# Patient Record
Sex: FEMALE | Race: OTHER | Employment: FULL TIME | URBAN - METROPOLITAN AREA
[De-identification: names, ages, dates, MRNs, and addresses within clinical notes are randomized per-mention and may not be internally consistent; named-entity substitution may affect disease eponyms.]

---

## 2019-12-21 ENCOUNTER — APPOINTMENT (OUTPATIENT)
Dept: GENERAL RADIOLOGY | Age: 54
End: 2019-12-21
Attending: EMERGENCY MEDICINE
Payer: SELF-PAY

## 2019-12-21 ENCOUNTER — HOSPITAL ENCOUNTER (EMERGENCY)
Age: 54
Discharge: HOME | End: 2019-12-21
Attending: EMERGENCY MEDICINE
Payer: SELF-PAY

## 2019-12-21 VITALS
TEMPERATURE: 98 F | OXYGEN SATURATION: 96 % | DIASTOLIC BLOOD PRESSURE: 57 MMHG | HEART RATE: 71 BPM | RESPIRATION RATE: 21 BRPM | SYSTOLIC BLOOD PRESSURE: 110 MMHG

## 2019-12-21 DIAGNOSIS — R07.9 CHEST PAIN, UNSPECIFIED TYPE: Primary | ICD-10-CM

## 2019-12-21 DIAGNOSIS — E87.6 HYPOKALEMIA: ICD-10-CM

## 2019-12-21 LAB
ALBUMIN SERPL-MCNC: 4.1 G/DL (ref 3.5–5)
ALBUMIN/GLOB SERPL: 1 {RATIO} (ref 1.2–3.5)
ALP SERPL-CCNC: 91 U/L (ref 50–130)
ALT SERPL-CCNC: 22 U/L (ref 12–65)
ANION GAP SERPL CALC-SCNC: 7 MMOL/L (ref 7–16)
AST SERPL-CCNC: 17 U/L (ref 15–37)
BASOPHILS # BLD: 0 K/UL (ref 0–0.2)
BASOPHILS NFR BLD: 0 % (ref 0–2)
BILIRUB SERPL-MCNC: 0.3 MG/DL (ref 0.2–1.1)
BUN SERPL-MCNC: 16 MG/DL (ref 6–23)
CALCIUM SERPL-MCNC: 9.4 MG/DL (ref 8.3–10.4)
CHLORIDE SERPL-SCNC: 100 MMOL/L (ref 98–107)
CO2 SERPL-SCNC: 31 MMOL/L (ref 21–32)
CREAT SERPL-MCNC: 0.82 MG/DL (ref 0.6–1)
D DIMER PPP FEU-MCNC: 0.29 UG/ML(FEU)
DIFFERENTIAL METHOD BLD: ABNORMAL
EOSINOPHIL # BLD: 0.2 K/UL (ref 0–0.8)
EOSINOPHIL NFR BLD: 1 % (ref 0.5–7.8)
ERYTHROCYTE [DISTWIDTH] IN BLOOD BY AUTOMATED COUNT: 13.2 % (ref 11.9–14.6)
GLOBULIN SER CALC-MCNC: 4 G/DL (ref 2.3–3.5)
GLUCOSE SERPL-MCNC: 106 MG/DL (ref 65–100)
HCT VFR BLD AUTO: 39.5 % (ref 35.8–46.3)
HGB BLD-MCNC: 13.5 G/DL (ref 11.7–15.4)
IMM GRANULOCYTES # BLD AUTO: 0.1 K/UL (ref 0–0.5)
IMM GRANULOCYTES NFR BLD AUTO: 1 % (ref 0–5)
LYMPHOCYTES # BLD: 3.4 K/UL (ref 0.5–4.6)
LYMPHOCYTES NFR BLD: 28 % (ref 13–44)
MCH RBC QN AUTO: 29.2 PG (ref 26.1–32.9)
MCHC RBC AUTO-ENTMCNC: 34.2 G/DL (ref 31.4–35)
MCV RBC AUTO: 85.5 FL (ref 79.6–97.8)
MONOCYTES # BLD: 0.8 K/UL (ref 0.1–1.3)
MONOCYTES NFR BLD: 6 % (ref 4–12)
NEUTS SEG # BLD: 7.7 K/UL (ref 1.7–8.2)
NEUTS SEG NFR BLD: 63 % (ref 43–78)
NRBC # BLD: 0 K/UL (ref 0–0.2)
PLATELET # BLD AUTO: 298 K/UL (ref 150–450)
PMV BLD AUTO: 9.9 FL (ref 9.4–12.3)
POTASSIUM SERPL-SCNC: 2.4 MMOL/L (ref 3.5–5.1)
PROT SERPL-MCNC: 8.1 G/DL (ref 6.3–8.2)
RBC # BLD AUTO: 4.62 M/UL (ref 4.05–5.2)
SODIUM SERPL-SCNC: 138 MMOL/L (ref 136–145)
TROPONIN I BLD-MCNC: 0 NG/ML (ref 0.02–0.05)
TROPONIN I BLD-MCNC: 0 NG/ML (ref 0.02–0.05)
TROPONIN I SERPL-MCNC: <0.02 NG/ML (ref 0.02–0.05)
TROPONIN I SERPL-MCNC: <0.02 NG/ML (ref 0.02–0.05)
WBC # BLD AUTO: 12.2 K/UL (ref 4.3–11.1)

## 2019-12-21 PROCEDURE — 93005 ELECTROCARDIOGRAM TRACING: CPT | Performed by: EMERGENCY MEDICINE

## 2019-12-21 PROCEDURE — 84484 ASSAY OF TROPONIN QUANT: CPT

## 2019-12-21 PROCEDURE — 99285 EMERGENCY DEPT VISIT HI MDM: CPT | Performed by: EMERGENCY MEDICINE

## 2019-12-21 PROCEDURE — 74011250637 HC RX REV CODE- 250/637: Performed by: EMERGENCY MEDICINE

## 2019-12-21 PROCEDURE — 96366 THER/PROPH/DIAG IV INF ADDON: CPT | Performed by: EMERGENCY MEDICINE

## 2019-12-21 PROCEDURE — 80053 COMPREHEN METABOLIC PANEL: CPT

## 2019-12-21 PROCEDURE — 74011250636 HC RX REV CODE- 250/636: Performed by: EMERGENCY MEDICINE

## 2019-12-21 PROCEDURE — 85025 COMPLETE CBC W/AUTO DIFF WBC: CPT

## 2019-12-21 PROCEDURE — 85379 FIBRIN DEGRADATION QUANT: CPT

## 2019-12-21 PROCEDURE — 71046 X-RAY EXAM CHEST 2 VIEWS: CPT

## 2019-12-21 PROCEDURE — 96365 THER/PROPH/DIAG IV INF INIT: CPT | Performed by: EMERGENCY MEDICINE

## 2019-12-21 RX ORDER — POTASSIUM CHLORIDE 20 MEQ/1
20 TABLET, EXTENDED RELEASE ORAL DAILY
Qty: 7 TAB | Refills: 0 | Status: SHIPPED | OUTPATIENT
Start: 2019-12-21 | End: 2019-12-28

## 2019-12-21 RX ORDER — LOSARTAN POTASSIUM AND HYDROCHLOROTHIAZIDE 12.5; 1 MG/1; MG/1
1 TABLET ORAL DAILY
COMMUNITY

## 2019-12-21 RX ORDER — POTASSIUM CHLORIDE 14.9 MG/ML
20 INJECTION INTRAVENOUS
Status: COMPLETED | OUTPATIENT
Start: 2019-12-21 | End: 2019-12-21

## 2019-12-21 RX ORDER — GUAIFENESIN 100 MG/5ML
324 LIQUID (ML) ORAL
Status: COMPLETED | OUTPATIENT
Start: 2019-12-21 | End: 2019-12-21

## 2019-12-21 RX ADMIN — ASPIRIN 81 MG 324 MG: 81 TABLET ORAL at 22:56

## 2019-12-21 RX ADMIN — SODIUM CHLORIDE 1000 ML: 900 INJECTION, SOLUTION INTRAVENOUS at 20:13

## 2019-12-21 RX ADMIN — POTASSIUM BICARBONATE 20 MEQ: 782 TABLET, EFFERVESCENT ORAL at 20:13

## 2019-12-21 RX ADMIN — POTASSIUM CHLORIDE 20 MEQ: 14.9 INJECTION, SOLUTION INTRAVENOUS at 20:14

## 2019-12-22 LAB
ATRIAL RATE: 105 BPM
ATRIAL RATE: 70 BPM
CALCULATED P AXIS, ECG09: 62 DEGREES
CALCULATED P AXIS, ECG09: 63 DEGREES
CALCULATED R AXIS, ECG10: -5 DEGREES
CALCULATED R AXIS, ECG10: 18 DEGREES
CALCULATED T AXIS, ECG11: 42 DEGREES
CALCULATED T AXIS, ECG11: 46 DEGREES
DIAGNOSIS, 93000: NORMAL
DIAGNOSIS, 93000: NORMAL
P-R INTERVAL, ECG05: 124 MS
P-R INTERVAL, ECG05: 126 MS
Q-T INTERVAL, ECG07: 358 MS
Q-T INTERVAL, ECG07: 404 MS
QRS DURATION, ECG06: 78 MS
QRS DURATION, ECG06: 78 MS
QTC CALCULATION (BEZET), ECG08: 436 MS
QTC CALCULATION (BEZET), ECG08: 473 MS
VENTRICULAR RATE, ECG03: 105 BPM
VENTRICULAR RATE, ECG03: 70 BPM

## 2019-12-22 NOTE — ED PROVIDER NOTES
58-year-old female with history of hypertension presents with complaint of left-sided chest pressure that developed around 1 and half hours ago. Patient reports history of MI 20 years ago while living in [facility]. Patient states that chest pain is worsened with deep breathing. Patient denies radiation of pain. Denies nausea, vomiting, diaphoresis, leg swelling or pain, hemoptysis, dizziness. Patient states that she is not currently followed by cardiologist. Reports family hx of CAD. Patient declines . The history is provided by the patient. No  was used. Chest Pain (Angina)    This is a new problem. The current episode started 1 to 2 hours ago. The problem has been resolved. The problem occurs rarely. The pain is associated with rest. The pain is present in the substernal region and left side. The pain is at a severity of 2/10. The pain is mild. The quality of the pain is described as sharp. The pain does not radiate. Pertinent negatives include no abdominal pain, no cough, no diaphoresis, no dizziness, no exertional chest pressure, no fever, no headaches, no hemoptysis, no irregular heartbeat, no leg pain, no lower extremity edema, no malaise/fatigue, no nausea, no near-syncope, no numbness, no orthopnea, no palpitations, no PND, no sputum production, no vomiting and no weakness. She has tried nothing for the symptoms. The treatment provided no relief. Risk factors include hypertension and cardiac disease. Past Medical History:   Diagnosis Date    HTN (hypertension)        Past Surgical History:   Procedure Laterality Date    HX CHOLECYSTECTOMY           History reviewed. No pertinent family history.     Social History     Socioeconomic History    Marital status:      Spouse name: Not on file    Number of children: Not on file    Years of education: Not on file    Highest education level: Not on file   Occupational History    Not on file   Social Needs    Financial resource strain: Not on file    Food insecurity:     Worry: Not on file     Inability: Not on file    Transportation needs:     Medical: Not on file     Non-medical: Not on file   Tobacco Use    Smoking status: Never Smoker    Smokeless tobacco: Never Used   Substance and Sexual Activity    Alcohol use: Yes     Comment: ocaasional    Drug use: Never    Sexual activity: Not on file   Lifestyle    Physical activity:     Days per week: Not on file     Minutes per session: Not on file    Stress: Not on file   Relationships    Social connections:     Talks on phone: Not on file     Gets together: Not on file     Attends Zoroastrian service: Not on file     Active member of club or organization: Not on file     Attends meetings of clubs or organizations: Not on file     Relationship status: Not on file    Intimate partner violence:     Fear of current or ex partner: Not on file     Emotionally abused: Not on file     Physically abused: Not on file     Forced sexual activity: Not on file   Other Topics Concern    Not on file   Social History Narrative    Not on file         ALLERGIES: Patient has no known allergies. Review of Systems   Constitutional: Negative for chills, diaphoresis, fatigue, fever and malaise/fatigue. Respiratory: Negative for cough, hemoptysis, sputum production, wheezing and stridor. Cardiovascular: Positive for chest pain. Negative for palpitations, orthopnea, leg swelling, PND and near-syncope. Gastrointestinal: Negative for abdominal pain, nausea and vomiting. Genitourinary: Negative for dysuria and flank pain. Musculoskeletal: Negative for gait problem and myalgias. Skin: Negative for rash. Neurological: Negative for dizziness, syncope, weakness, light-headedness, numbness and headaches.        Vitals:    12/21/19 1913 12/21/19 1920   BP: 162/76    Pulse: 97    Resp: 21    Temp: 98 °F (36.7 °C)    SpO2: 99% 100%            Physical Exam  Vitals signs and nursing note reviewed. Constitutional:       Appearance: She is well-developed. Comments: Well appearing and in NAD. HENT:      Head: Normocephalic. Eyes:      Conjunctiva/sclera: Conjunctivae normal.      Pupils: Pupils are equal, round, and reactive to light. Neck:      Vascular: No JVD. Trachea: No tracheal deviation. Cardiovascular:      Rate and Rhythm: Normal rate and regular rhythm. Heart sounds: Normal heart sounds. Comments: RRR. Pulses 2+ and equal bilaterally. Pulmonary:      Effort: Pulmonary effort is normal.      Breath sounds: Normal breath sounds. Abdominal:      Palpations: Abdomen is soft. Comments: Soft, NTND. Musculoskeletal: Normal range of motion. Comments: No LE edema. No calf TTP. Skin:     General: Skin is warm and dry. Comments: No rash. Neurological:      Mental Status: She is alert and oriented to person, place, and time. Cranial Nerves: No cranial nerve deficit. Comments: Strength 5/5 throughout. Normal sensory. MDM  Number of Diagnoses or Management Options  Chest pain, unspecified type: new and requires workup  Hypokalemia: new and requires workup  Diagnosis management comments: EKG with sinus tachycardia. No ischemic findings appreciated. Chest x-ray clear. Initial troponin and repeat 3-hour troponin within normal ends. D-dimer within normal limits. Patient found to be hypokalemic. Potassium repleted with oral and IV supplementation. Patient on losartan and hydrochlorothiazide. Patient asymptomatic this time. Denies chest pain. Patient given aspirin. Will discharge home with potassium supplementation and instructions for close outpatient follow-up with primary care physician and cardiology. [address] Cardiology contacted to establish prompt outpatient follow-up. Patient instructed to return to emergency department if symptoms worsen or progress in any way.        Amount and/or Complexity of Data Reviewed  Clinical lab tests: ordered and reviewed  Tests in the radiology section of CPT®: ordered and reviewed  Tests in the medicine section of CPT®: ordered and reviewed  Review and summarize past medical records: yes  Discuss the patient with other providers: yes  Independent visualization of images, tracings, or specimens: yes    Risk of Complications, Morbidity, and/or Mortality  Presenting problems: moderate  Diagnostic procedures: moderate  Management options: moderate    Patient Progress  Patient progress: stable    ED Course as of Dec 21 2250   Sat Dec 21, 2019   2001 CXR IMPRESSION: Unremarkable chest radiograph. [DF]      ED Course User Index  [DF] Juan Ramon Teixeira MD       EKG  Date/Time: 12/21/2019 7:28 PM  Performed by: Juan Ramon Teixeira MD  Authorized by: Juan Ramon Teixeira MD     ECG reviewed by ED Physician in the absence of a cardiologist: yes    Rate:     ECG rate:  105     ECG rate assessment: tachycardic    Rhythm:     Rhythm: sinus tachycardia    QRS:     QRS axis:  Normal    QRS intervals:  Normal  Conduction:     Conduction: normal    ST segments:     ST segments:  Normal  T waves:     T waves: normal                Results Include:    Recent Results (from the past 24 hour(s))   CBC WITH AUTOMATED DIFF    Collection Time: 12/21/19  7:16 PM   Result Value Ref Range    WBC 12.2 (H) 4.3 - 11.1 K/uL    RBC 4.62 4.05 - 5.2 M/uL    HGB 13.5 11.7 - 15.4 g/dL    HCT 39.5 35.8 - 46.3 %    MCV 85.5 79.6 - 97.8 FL    MCH 29.2 26.1 - 32.9 PG    MCHC 34.2 31.4 - 35.0 g/dL    RDW 13.2 11.9 - 14.6 %    PLATELET 864 837 - 727 K/uL    MPV 9.9 9.4 - 12.3 FL    ABSOLUTE NRBC 0.00 0.0 - 0.2 K/uL    DF AUTOMATED      NEUTROPHILS 63 43 - 78 %    LYMPHOCYTES 28 13 - 44 %    MONOCYTES 6 4.0 - 12.0 %    EOSINOPHILS 1 0.5 - 7.8 %    BASOPHILS 0 0.0 - 2.0 %    IMMATURE GRANULOCYTES 1 0.0 - 5.0 %    ABS. NEUTROPHILS 7.7 1.7 - 8.2 K/UL    ABS. LYMPHOCYTES 3.4 0.5 - 4.6 K/UL    ABS. MONOCYTES 0.8 0.1 - 1.3 K/UL    ABS. EOSINOPHILS 0.2 0.0 - 0.8 K/UL    ABS. BASOPHILS 0.0 0.0 - 0.2 K/UL    ABS. IMM. GRANS. 0.1 0.0 - 0.5 K/UL   METABOLIC PANEL, COMPREHENSIVE    Collection Time: 12/21/19  7:16 PM   Result Value Ref Range    Sodium 138 136 - 145 mmol/L    Potassium 2.4 (LL) 3.5 - 5.1 mmol/L    Chloride 100 98 - 107 mmol/L    CO2 31 21 - 32 mmol/L    Anion gap 7 7 - 16 mmol/L    Glucose 106 (H) 65 - 100 mg/dL    BUN 16 6 - 23 MG/DL    Creatinine 0.82 0.6 - 1.0 MG/DL    GFR est AA >60 >60 ml/min/1.73m2    GFR est non-AA >60 >60 ml/min/1.73m2    Calcium 9.4 8.3 - 10.4 MG/DL    Bilirubin, total 0.3 0.2 - 1.1 MG/DL    ALT (SGPT) 22 12 - 65 U/L    AST (SGOT) 17 15 - 37 U/L    Alk. phosphatase 91 50 - 130 U/L    Protein, total 8.1 6.3 - 8.2 g/dL    Albumin 4.1 3.5 - 5.0 g/dL    Globulin 4.0 (H) 2.3 - 3.5 g/dL    A-G Ratio 1.0 (L) 1.2 - 3.5     TROPONIN I    Collection Time: 12/21/19  7:16 PM   Result Value Ref Range    Troponin-I, Qt. <0.02 (L) 0.02 - 0.05 NG/ML   D DIMER    Collection Time: 12/21/19  7:16 PM   Result Value Ref Range    D DIMER 0.29 <0.56 ug/ml(FEU)   POC TROPONIN-I    Collection Time: 12/21/19  7:18 PM   Result Value Ref Range    Troponin-I (POC) 0 (L) 0.02 - 0.05 ng/ml   POC TROPONIN-I    Collection Time: 12/21/19 10:14 PM   Result Value Ref Range    Troponin-I (POC) 0 (L) 0.02 - 0.05 ng/ml   TROPONIN I    Collection Time: 12/21/19 10:15 PM   Result Value Ref Range    Troponin-I, Qt. <0.02 (L) 0.02 - 0.05 NG/ML              Hilario Anguiano MD; 12/21/2019 @7:28 PM Voice dictation software was used during the making of this note. This software is not perfect and grammatical and other typographical errors may be present.   This note has not been proofread for errors.  ===================================================================

## 2019-12-22 NOTE — ED NOTES
I have reviewed discharge instructions with the patient and spouse. The patient and spouse verbalized understanding. Patient left ED via Discharge Method: ambulatory to Home with Dorothy Quiñones, her . Opportunity for questions and clarification provided. Patient given 1 scripts. To continue your aftercare when you leave the hospital, you may receive an automated call from our care team to check in on how you are doing. This is a free service and part of our promise to provide the best care and service to meet your aftercare needs.  If you have questions, or wish to unsubscribe from this service please call [phone]. Thank you for Choosing our [facility] Emergency Department.

## 2019-12-22 NOTE — DISCHARGE INSTRUCTIONS
Take potassium supplement as directed. Schedule up close follow-up with primary care physician within 1 week. Take ASA 81 mg po daily. Schedule close follow-up with cardiology. Return to emergency department if symptoms worsen or progress in any way. Dolor de pecho: Instrucciones de cuidado - [ Chest Pain: Care Instructions ]  Instrucciones de cuidado    El dolor de pecho puede tener muchas causas. Algunas no son graves y mejorarán por sí solas en pocos días. Jose L algunos tipos de dolor de pecho requieren más pruebas y [place]. Es posible que zuniga médico le haya recomendado ebony visita de seguimiento dentro de las 8 a 12 horas siguientes. Si no mejora, es posible que necesite [address] pruebas o [place]. Aunque zuniga médico le haya dado de keyanna, es necesario que esté atento a cualquier problema que se presente. El médico le hizo un cuidadoso chequeo, jose l a veces los problemas pueden aparecer posteriormente. Si tiene nuevos síntomas o éstos no mejoran, obtenga atención médica de inmediato. Si tiene dolor o presión en el pecho que empeora o es diferente y que dura más de 5 minutos, o se desmayó (perdió el conocimiento), llame al 911 o busque otra ayuda de emergencia de inmediato. Acudir a ebony consulta médica es sólo un paso en zuniga tratamiento. Aunque se sienta mejor, todavía deberá hacer lo que zuniga médico le recomiende, gamaliel asistir a todas las visitas de seguimiento sugeridas y kolby los medicamentos exactamente [employer]	Early fueron indicados. Albers le ayudará a recuperarse y prevenir problemas futuros. ¿Cómo puede cuidarse en el hogar? · Descanse hasta que se sienta mejor. · [place] gabriele medicamentos exactamente gamaliel le fueron recetados. Llame a zuniga médico si joss estar teniendo problemas con zuniga medicamento. · No conduzca después de kolby un analgésico (medicamento para el dolor) recetado. ¿Cuándo debe pedir ayuda?   Llame al 911 si:    · Se desmayó (perdió el conocimiento).     · Leva Broccoli graves dificultades para respirar.     · Tiene síntomas de un ataque al corazón. Estos podrían incluir:  ? Trinidad Washburn en el pecho, o ebony sensación extraña en el pecho.  ? Sudoración. ? Falta de aire. ? Náuseas o vómito. ? Dolor, presión o ebony sensación extraña en la espalda, el shad, la mandíbula, la parte superior del abdomen o en lauren o ambos hombros o brazos. ? Aturdimiento o debilidad repentina. ? Latidos del corazón rápidos o irregulares. Después de llamar al  911, es posible que el operador le diga que mastique 1 aspirina para adultos o de 2 a 4 aspirinas de dosis baja. Espere a ebony ambulancia. No intente conducir usted mismo.    Llame hoy a zuniga médico si:    · Tiene cualquier dificultad para respirar.     · El dolor en el pecho empeora.     · Siente mareos o aturdimiento, o que está a punto de desmayarse.     · No mejora gamaliel se esperaba.     · Tiene dolor de pecho nuevo o diferente. ¿Dónde puede encontrar más información en inglés? Darshana Robins a [email or web address]-jos.info/. Escriba A120 en la búsqueda para aprender más acerca de \"Dolor de pecho: Instrucciones de cuidado - [ Chest Pain: Care Instructions ]. \"  Revisado: 26 junio, 2019  Versión del contenido: 12.2  © 8409-2375 Healthwise, Incorporated. Las instrucciones de cuidado fueron adaptadas bajo licencia por [employer] (which disclaims liability or warranty for this information). Si usted tiene [place] afección médica o sobre estas instrucciones, siempre pregunte a zuniga profesional de doretha. Healthwise, Incorporated niega toda garantía o responsabilidad por zuniga uso de esta información. Patient Education        Hipocalemia: Bowman Castles - [ Hypokalemia: Care Instructions ]  Instrucciones de cuidado    La hipocalemia es un nivel bajo de potasio. El corazón, los músculos, los riñones y el sistema nervioso necesitan potasio para funcionar correctamente. Micaela problema tiene muchas [employer]bluebottle[employer].  Los [employer] renales, la alimentación y los medicamentos gamaliel diuréticos y laxantes pueden causarla. [address] En algunos casos, el cáncer es la causa. Zuniga médico puede hacerle pruebas para determinar la causa de gabriele niveles bajos de Eduardo. Es posible que necesite medicamentos para hacer que gabriele niveles de potasio vuelvan a la normalidad. También podría tener que hacerse análisis de jorge luis con regularidad para revisar el potasio. Si tiene un nivel de potasio muy bajo, podría necesitar medicamentos por vía intravenosa (IV). También podría necesitar pruebas para revisar la actividad eléctrica de zuniga corazón. Los problemas del corazón causados por los bajos niveles de potasio pueden ser [employer]. La atención de seguimiento es ebony parte clave de zuniga tratamiento y seguridad. Asegúrese de hacer y acudir a todas las citas, y llame a zuniga médico si está teniendo problemas. También es ebony buena idea saber los resultados de gabriele exámenes y mantener ebony lista de los medicamentos que kristine. ¿Cómo puede cuidarse en el hogar? · Si el médico lo recomienda, consuma alimentos que tengan [employer]. Estos incluyen frutas frescas, jugos y verduras. [address], los frijoles (habichuelas) y [address]. · Sea raya con los medicamentos. Si zuniga médico le receta medicamentos o suplementos de potasio, tómelos según las indicaciones. Llame a zuniga médico si tiene algún problema con los medicamentos. · Hágase análisis para revisar gabriele niveles de potasio con la frecuencia que le indique el médico.  ¿Cuándo debe pedir ayuda? Llame al 911 en cualquier momento que considere que necesita atención de [place]. Por ejemplo, llame si:    · Siente que zuniga corazón omite latidos.  Los problemas del corazón causados por los bajos niveles de potasio pueden provocar la muerte.     · Se desmayó (perdió el conocimiento).     · Tiene un episodio de convulsiones.    Llame a zuniga médico ahora mismo o busque atención [place] inmediata si:    · Se siente débil o inusualmente cansado.     · Tiene sarah calambres en los brazos o las piernas.     · Tiene hormigueo o entumecimiento.     · Siente el estómago revuelto, o vomita.     · Tiene retortijones abdominales.     · Está abotagado o estreñido.     · Necesita orinar con mucha frecuencia.     · Tiene mucha sed la mayor parte del tiempo.     · Siente mareos o aturdimiento, o que está a punto de desmayarse.     · Se siente deprimido o pierde el contacto con la realidad.    Preste especial atención a los cambios en zuniga doretha y asegúrese de comunicarse con zuniga médico si:    · No mejora gamaliel se esperaba. ¿Dónde puede encontrar más información en inglés? Keyla Dorsey a http://jennie-jos.info/. Daisy Glassing F587 en la búsqueda para aprender más acerca de \"Hipocalemia: Instrucciones de cuidado - [ Hypokalemia: Care Instructions ]. \"  Revisado: 6 noviembre, 2018  Versión del contenido: 12.2  © 0143-2337 Healthwise, Incorporated. Las instrucciones de cuidado fueron adaptadas bajo licencia por [employer] (which disclaims liability or warranty for this information). Si usted tiene [place] afección médica o sobre estas instrucciones, siempre pregunte a zuniga profesional de doretha. Healthwise, Incorporated niega toda garantía o responsabilidad por zungia uso de esta información.